# Patient Record
(demographics unavailable — no encounter records)

---

## 2024-10-17 NOTE — PHYSICAL EXAM
[FreeTextEntry1] : Medical assistant present for duration of physical examination  General no acute distress, alert and oriented Psych calm, pleasant demeanor, responding appropriately to question Well nourished Comfortable in chair

## 2024-10-17 NOTE — HISTORY OF PRESENT ILLNESS
[FreeTextEntry1] : 51 y/o F presents for follow up.   PMH HTN, arthritis, eczema PSH removal of right ovarian cyst, removal of right tube and ovary due to endometriosis Meds - HCTZ, meloxicam  Colonoscopy completed 6/6/22. States a large polyp was seen, not able to be resected - biopsy performed and benign. She was referred to another GI to have polyp removed. She states she underwent therapeutic procedure 7/26/22 and polyp was partially removed with mucosal resection. She states no precancerous tissue found.  Initially seen 3/21/23 to "establish care for rectosigmoid polyp" - self referred. Prior care was in [facility]. Was referred to a colorectal surgeon and had CT scan performed. Reports had flex sig 9/29/22 who tattooed the area. Was initially recommended surgical rection, but later on recommended to observe with follow up colonoscopy in 6 months.   Reviewing her requested outside records:  Colonoscopy 6/6/22 w/ Dr. Donna Jones noted a frond-like villous and sessile non-obstructing medium sized mass found, partially circumferential (involving 1/2 of lumen). Mass biopsied. Rectal 4 mm sessile polyp, s/p resection.  Pathology: Rectosigmoid: Benign colonic mucosa w/ eroded granulation tissue, reactive and hyperplastic epithelial changes Rectum: Hyperplastic polyp.  Therapeutic Colonoscopy 7/26/22 w/ Dr. Trish Thompson at [facility] noted a 22 mm polyp found in the rectosigmoid colon from approximately 18-20 cm from AV. Polyp was flat and sessile. Sale and Orise gel was injected to lift the lesion from the muscularis propria. Snare mucosal resection was performed. Due to flat nature of polyp and partial lift in some areas, en-bloc resection was not possible, therefore piecemeal resection was performed. The proximal end of the lesion where the polyp was flat and difficult to lift could not be resected. Biopsies taken from this area ("No. 2"). Resection was incomplete. Tattoo placed 2 cm proximal to the polyp.  Pathology: Rectosigmoid: Fragments of rectal mucosa w/ features of mucosal prolapse (i.e. solitary rectal ulcer). Marked thermal artifact present  Rectosigmoid No.2: Fragments of inflammatory polyp w/ ulceration. Mucosal prolapse cannot be excluded  Her pathology slides from [place] were requested and reviewed at [facility] pathologists agree with the outside pathology reports.  She was scoped by GI Dr De Luna at [facility] in April 2023, including EUS. He noted at approximately 20 cm from AV, an area of abnormal appearing tissue was encountered. Past this area there was some stricturing and a tattoo more proximally. No true adenomatous tissue visualized at the site. It appeared to be a submucosal lesion that was causing vague tenting effect on the mucosal surface. Given this appearance a decision was made to take mucosal biopsies, and proceed w/ EUS to understand depth of involvement. 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  Pathology: Sigmoid mass, biopsy: Fragments of colonic mucosa with suggestion of damage with lamina propria fibrosis. Fragment of colonic mucosa without significant pathologic features.  Comment: Multiple levels are examined. There is no evidence of neoplasm. A submucosal lesion cannot be ruled out.  Seen 5/2/23 for follow up. Denied issues with BMs, although admits she may experience incomplete emptying and have to attempt to have BM until fully emptied. She was recommended to obtain MR defecography and CTAP.  5/23/23 CTAP at [facility] Impression: No definite colorectal mass. (Some focal mild thickening noted) No evidence of metastatic disease.  5/23/23 MR defecoagraphy at [facility] Impression: Only able to evacuate small amounts of the gel from the rectum. Moderate posterior compartment descent with moderate sized anterior rectocele.  Last seen 6/21/23 via telehealth.  Case reviewed in multidisciplinary fashion, including discussion with colorectal surgery division providers and GI provider and consensus recommendation reached. Consistent with solitary rectal ulcer syndrome. Surgical intervention not recommended. Monitor symptoms clinically. Bowel regimen reviewed Recommend pelvic floor physical therapy and biofeedback program.  Repeat flex sig in 6 months.   Patient presents for follow up and flexible sigmoidoscopy. feeling well, no complaints. 1 soft BM/ day, trying to increase dietary fiber and water intake as advised by nutritionist. She is no longer seeing nutritionist. She did not try pelvic floor physical therapy.   She has not seen any providers for surveillance since the telehealth visit in June 2023.

## 2024-10-17 NOTE — ASSESSMENT
[FreeTextEntry1] : Continue supportive care and surveillance. Had full colonoscopy in 2022. F/u 1 year for flex sig or sooner as needed. All questions were answered, patient expressed understanding, and is agreeable to this plan.

## 2024-10-17 NOTE — HISTORY OF PRESENT ILLNESS
[FreeTextEntry1] : 51 y/o F presents for follow up.   PMH HTN, arthritis, eczema PSH removal of right ovarian cyst, removal of right tube and ovary due to endometriosis Meds - HCTZ, meloxicam  Colonoscopy completed 6/6/22. States a large polyp was seen, not able to be resected - biopsy performed and benign. She was referred to another GI to have polyp removed. She states she underwent therapeutic procedure 7/26/22 and polyp was partially removed with mucosal resection. She states no precancerous tissue found.  Initially seen 3/21/23 to "establish care for rectosigmoid polyp" - self referred. Prior care was in [facility]. Was referred to a colorectal surgeon and had CT scan performed. Reports had flex sig 9/29/22 who tattooed the area. Was initially recommended surgical rection, but later on recommended to observe with follow up colonoscopy in 6 months.   Reviewing her requested outside records:  Colonoscopy 6/6/22 w/ Dr. Donna Jones noted a frond-like villous and sessile non-obstructing medium sized mass found, partially circumferential (involving 1/2 of lumen). Mass biopsied. Rectal 4 mm sessile polyp, s/p resection.  Pathology: Rectosigmoid: Benign colonic mucosa w/ eroded granulation tissue, reactive and hyperplastic epithelial changes Rectum: Hyperplastic polyp.  Therapeutic Colonoscopy 7/26/22 w/ Dr. Trish Thompson at [facility] noted a 22 mm polyp found in the rectosigmoid colon from approximately 18-20 cm from AV. Polyp was flat and sessile. Sale and Orise gel was injected to lift the lesion from the muscularis propria. Snare mucosal resection was performed. Due to flat nature of polyp and partial lift in some areas, en-bloc resection was not possible, therefore piecemeal resection was performed. The proximal end of the lesion where the polyp was flat and difficult to lift could not be resected. Biopsies taken from this area ("No. 2"). Resection was incomplete. Tattoo placed 2 cm proximal to the polyp.  Pathology: Rectosigmoid: Fragments of rectal mucosa w/ features of mucosal prolapse (i.e. solitary rectal ulcer). Marked thermal artifact present  Rectosigmoid No.2: Fragments of inflammatory polyp w/ ulceration. Mucosal prolapse cannot be excluded  Her pathology slides from [place] were requested and reviewed at [facility] pathologists agree with the outside pathology reports.  She was scoped by GI Dr De Luna at [facility] in April 2023, including EUS. He noted at approximately 20 cm from AV, an area of abnormal appearing tissue was encountered. Past this area there was some stricturing and a tattoo more proximally. No true adenomatous tissue visualized at the site. It appeared to be a submucosal lesion that was causing vague tenting effect on the mucosal surface. Given this appearance a decision was made to take mucosal biopsies, and proceed w/ EUS to understand depth of involvement. 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  Pathology: Sigmoid mass, biopsy: Fragments of colonic mucosa with suggestion of damage with lamina propria fibrosis. Fragment of colonic mucosa without significant pathologic features.  Comment: Multiple levels are examined. There is no evidence of neoplasm. A submucosal lesion cannot be ruled out.  Seen 5/2/23 for follow up. Denied issues with BMs, although admits she may experience incomplete emptying and have to attempt to have BM until fully emptied. She was recommended to obtain MR defecography and CTAP.  5/23/23 CTAP at [facility] Impression: No definite colorectal mass. (Some focal mild thickening noted) No evidence of metastatic disease.  5/23/23 MR defecoagraphy at [facility] Impression: Only able to evacuate small amounts of the gel from the rectum. Moderate posterior compartment descent with moderate sized anterior rectocele.  Last seen 6/21/23 via telehealth.  Case reviewed in multidisciplinary fashion, including discussion with colorectal surgery division providers and GI provider and consensus recommendation reached. Consistent with solitary rectal ulcer syndrome. Surgical intervention not recommended. Monitor symptoms clinically. Bowel regimen reviewed Recommend pelvic floor physical therapy and biofeedback program.  Repeat flex sig in 6 months.   Patient presents for follow up and flexible sigmoidoscopy. feeling well, no complaints. 1 soft BM/ day, trying to increase dietary fiber and water intake as advised by nutritionist. She is no longer seeing nutritionist. She did not try pelvic floor physical therapy.   She has not seen any providers for surveillance since the telehealth visit in June 2023.

## 2024-10-17 NOTE — HISTORY OF PRESENT ILLNESS
[FreeTextEntry1] : 49 y/o F presents for follow up.   PMH HTN, arthritis, eczema PSH removal of right ovarian cyst, removal of right tube and ovary due to endometriosis Meds - HCTZ, meloxicam  Colonoscopy completed 6/6/22. States a large polyp was seen, not able to be resected - biopsy performed and benign. She was referred to another GI to have polyp removed. She states she underwent therapeutic procedure 7/26/22 and polyp was partially removed with mucosal resection. She states no precancerous tissue found.  Initially seen 3/21/23 to "establish care for rectosigmoid polyp" - self referred. Prior care was in [facility]. Was referred to a colorectal surgeon and had CT scan performed. Reports had flex sig 9/29/22 who tattooed the area. Was initially recommended surgical rection, but later on recommended to observe with follow up colonoscopy in 6 months.   Reviewing her requested outside records:  Colonoscopy 6/6/22 w/ Dr. Donna Jones noted a frond-like villous and sessile non-obstructing medium sized mass found, partially circumferential (involving 1/2 of lumen). Mass biopsied. Rectal 4 mm sessile polyp, s/p resection.  Pathology: Rectosigmoid: Benign colonic mucosa w/ eroded granulation tissue, reactive and hyperplastic epithelial changes Rectum: Hyperplastic polyp.  Therapeutic Colonoscopy 7/26/22 w/ Dr. Trish Thompson at [facility] noted a 22 mm polyp found in the rectosigmoid colon from approximately 18-20 cm from AV. Polyp was flat and sessile. Sale and Orise gel was injected to lift the lesion from the muscularis propria. Snare mucosal resection was performed. Due to flat nature of polyp and partial lift in some areas, en-bloc resection was not possible, therefore piecemeal resection was performed. The proximal end of the lesion where the polyp was flat and difficult to lift could not be resected. Biopsies taken from this area ("No. 2"). Resection was incomplete. Tattoo placed 2 cm proximal to the polyp.  Pathology: Rectosigmoid: Fragments of rectal mucosa w/ features of mucosal prolapse (i.e. solitary rectal ulcer). Marked thermal artifact present  Rectosigmoid No.2: Fragments of inflammatory polyp w/ ulceration. Mucosal prolapse cannot be excluded  Her pathology slides from [place] were requested and reviewed at [facility] pathologists agree with the outside pathology reports.  She was scoped by GI Dr De Luna at [facility] in April 2023, including EUS. He noted at approximately 20 cm from AV, an area of abnormal appearing tissue was encountered. Past this area there was some stricturing and a tattoo more proximally. No true adenomatous tissue visualized at the site. It appeared to be a submucosal lesion that was causing vague tenting effect on the mucosal surface. Given this appearance a decision was made to take mucosal biopsies, and proceed w/ EUS to understand depth of involvement. 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  Pathology: Sigmoid mass, biopsy: Fragments of colonic mucosa with suggestion of damage with lamina propria fibrosis. Fragment of colonic mucosa without significant pathologic features.  Comment: Multiple levels are examined. There is no evidence of neoplasm. A submucosal lesion cannot be ruled out.  Seen 5/2/23 for follow up. Denied issues with BMs, although admits she may experience incomplete emptying and have to attempt to have BM until fully emptied. She was recommended to obtain MR defecography and CTAP.  5/23/23 CTAP at [facility] Impression: No definite colorectal mass. (Some focal mild thickening noted) No evidence of metastatic disease.  5/23/23 MR defecoagraphy at [facility] Impression: Only able to evacuate small amounts of the gel from the rectum. Moderate posterior compartment descent with moderate sized anterior rectocele.  Last seen 6/21/23 via telehealth.  Case reviewed in multidisciplinary fashion, including discussion with colorectal surgery division providers and GI provider and consensus recommendation reached. Consistent with solitary rectal ulcer syndrome. Surgical intervention not recommended. Monitor symptoms clinically. Bowel regimen reviewed Recommend pelvic floor physical therapy and biofeedback program.  Repeat flex sig in 6 months.   Patient presents for follow up and flexible sigmoidoscopy. feeling well, no complaints. 1 soft BM/ day, trying to increase dietary fiber and water intake as advised by nutritionist. She is no longer seeing nutritionist. She did not try pelvic floor physical therapy.   She has not seen any providers for surveillance since the telehealth visit in June 2023.

## 2024-10-17 NOTE — PROCEDURE
[FreeTextEntry1] : Procedure: Flexible Sigmoidoscopy  Pre procedure Diagnosis: h/o solitary rectal ulcer syndrome Post procedure Diagnosis: h/o solitary rectal ulcer syndrome Anesthesia: none Estimated blood loss: none Specimen: none Complications: none  Consent obtained. Enema was provided for bowel prep. Flexible sigmoidoscopy was performed by passing a lighted sigmoidoscope with lubricant jelly into the anorectal canal and the entire anal mucosal surface was inspected. Scope was advanced 30 cm from anal verge. Findings included tattoo at 20cm from anal verge. Just proximal to tattoo there was evidence of well healed scar consistent with prior endoscopic interventions. No polyps or adenomatous appearing mucosal tissue visualized.  Patient tolerated examination and procedure well.